# Patient Record
Sex: FEMALE | Race: WHITE | Employment: UNEMPLOYED
[De-identification: names, ages, dates, MRNs, and addresses within clinical notes are randomized per-mention and may not be internally consistent; named-entity substitution may affect disease eponyms.]

---

## 2017-06-03 ENCOUNTER — HEALTH MAINTENANCE LETTER (OUTPATIENT)
Age: 27
End: 2017-06-03

## 2017-12-16 ENCOUNTER — HEALTH MAINTENANCE LETTER (OUTPATIENT)
Age: 27
End: 2017-12-16

## 2018-06-23 ENCOUNTER — HEALTH MAINTENANCE LETTER (OUTPATIENT)
Age: 28
End: 2018-06-23

## 2018-10-15 ENCOUNTER — NURSE TRIAGE (OUTPATIENT)
Dept: NURSING | Facility: CLINIC | Age: 28
End: 2018-10-15

## 2018-10-15 ENCOUNTER — RESULT FOLLOW UP (OUTPATIENT)
Dept: OBGYN | Facility: CLINIC | Age: 28
End: 2018-10-15

## 2018-10-15 ENCOUNTER — OFFICE VISIT (OUTPATIENT)
Dept: OBGYN | Facility: CLINIC | Age: 28
End: 2018-10-15
Payer: COMMERCIAL

## 2018-10-15 VITALS
HEIGHT: 70 IN | WEIGHT: 158.7 LBS | DIASTOLIC BLOOD PRESSURE: 70 MMHG | SYSTOLIC BLOOD PRESSURE: 104 MMHG | BODY MASS INDEX: 22.72 KG/M2

## 2018-10-15 DIAGNOSIS — R87.621 PAPANICOLAOU SMEAR OF VAGINA WITH ATYPICAL SQUAMOUS CELLS CANNOT EXCLUDE HIGH GRADE SQUAMOUS INTRAEPITHELIAL LESION (ASC-H): ICD-10-CM

## 2018-10-15 DIAGNOSIS — R10.2 PELVIC CRAMPING: ICD-10-CM

## 2018-10-15 DIAGNOSIS — N97.0 ANOVULATION: ICD-10-CM

## 2018-10-15 DIAGNOSIS — Z00.00 ENCOUNTER FOR PREVENTATIVE ADULT HEALTH CARE EXAMINATION: Primary | ICD-10-CM

## 2018-10-15 LAB
SPECIMEN SOURCE: NORMAL
WET PREP SPEC: NORMAL

## 2018-10-15 PROCEDURE — 87210 SMEAR WET MOUNT SALINE/INK: CPT | Performed by: FAMILY MEDICINE

## 2018-10-15 PROCEDURE — 99395 PREV VISIT EST AGE 18-39: CPT | Performed by: FAMILY MEDICINE

## 2018-10-15 PROCEDURE — 87491 CHLMYD TRACH DNA AMP PROBE: CPT | Performed by: FAMILY MEDICINE

## 2018-10-15 PROCEDURE — 87591 N.GONORRHOEAE DNA AMP PROB: CPT | Performed by: FAMILY MEDICINE

## 2018-10-15 PROCEDURE — 88175 CYTOPATH C/V AUTO FLUID REDO: CPT | Performed by: FAMILY MEDICINE

## 2018-10-15 PROCEDURE — 87624 HPV HI-RISK TYP POOLED RSLT: CPT | Performed by: FAMILY MEDICINE

## 2018-10-15 ASSESSMENT — ENCOUNTER SYMPTOMS: EYE PAIN: 1

## 2018-10-15 ASSESSMENT — PATIENT HEALTH QUESTIONNAIRE - PHQ9
10. IF YOU CHECKED OFF ANY PROBLEMS, HOW DIFFICULT HAVE THESE PROBLEMS MADE IT FOR YOU TO DO YOUR WORK, TAKE CARE OF THINGS AT HOME, OR GET ALONG WITH OTHER PEOPLE: NOT DIFFICULT AT ALL
SUM OF ALL RESPONSES TO PHQ QUESTIONS 1-9: 0
SUM OF ALL RESPONSES TO PHQ QUESTIONS 1-9: 0

## 2018-10-15 NOTE — PATIENT INSTRUCTIONS
Labs today     Ultrasound will call you     If you want mammogram otherwise age 33     I will notify you of labs     RN notify you of pap in 2 weeks     Sperm test recommended Dr. Davison can organize that [place]     Tubal dye study HSG (hysterosalpingo-gram)     Dr. Breanna Anguiano, DO    Obstetrics and Gynecology  [facility] [place] [facility] [place]

## 2018-10-15 NOTE — LETTER
October 26, 2018    Ivette Kenyno  [address]  [place] [address]    Dear Ivette,  We are happy to inform you that your PAP smear result from 10/15/18 is normal.  We are now able to do a follow up test on PAP smears. The DNA test is for HPV (Human Papilloma Virus). Cervical cancer is closely linked with certain types of HPV. Your results showed no evidence of high risk HPV.  Therefore we recommend you return in 1 year for your next pap smear and HPV test.  You will also need to return to the clinic every year for an annual exam and other preventive tests.  If you have additional questions regarding this result, please call our registered nurse, Rochelle at [phone].  Sincerely,    Breanna Anguiano DO/randell

## 2018-10-15 NOTE — TELEPHONE ENCOUNTER
Reason for Disposition    Caller requesting lab results    Additional Information    Negative: Lab calling with strep throat test results and triager can call in prescription    Negative: Lab calling with urinalysis test results and triager can call in prescription    Negative: Medication questions    Negative: ED call to PCP    Negative: Physician call to PCP    Negative: Call about patient who is currently hospitalized    Negative: Lab or radiology calling with CRITICAL test results    Negative: [1] Prescription not at pharmacy AND [2] was prescribed today by PCP    Negative: [1] Follow-up call from patient regarding patient's clinical status AND [2] information urgent    Negative: [1] Caller requests to speak ONLY to PCP AND [2] URGENT question    Negative: [1] Caller requests to speak to PCP now AND [2] won't tell us reason for call  (Exception: if 10 pm to 6 am, caller must first discuss reason for the call)    Negative: Notification of hospital admission    Negative: Notification of death    Protocols used: [facility]

## 2018-10-15 NOTE — LETTER
October 1, 2019      Ivette Kenyon  [address] [place] [address]    Dear MsNehaKostas,      This letter is to remind you that you are due for your follow up PAP smear and HPV test on or about 10/15/19.    Please call [phone] to schedule your appointment at your earliest convenience.     If you have completed the tests outside of [facility], please have the results forwarded to our office. We will update the chart for your primary Physician to review before your next annual physical.     Sincerely,      Your [facility] Care Team//[facility]

## 2018-10-15 NOTE — MR AVS SNAPSHOT
After Visit Summary   10/15/2018    Ivette Kenyon    MRN: [id number]           Patient Information     Date Of Birth          1990        Visit Information        Provider Department      10/15/2018 9:15 AM Breanna Anguiano,  [facility]        Today's Diagnoses     Encounter for preventative adult health care examination    -  1    Papanicolaou smear of vagina with atypical squamous cells cannot exclude high grade squamous intraepithelial lesion (ASC-H)        Anovulation        Pelvic cramping          Care Instructions    Labs today     Ultrasound will call you     If you want mammogram otherwise age 33     I will notify you of labs     RN notify you of pap in 2 weeks     Sperm test recommended Dr. Davison can organize that [place]     Tubal dye study HSG (hysterosalpingo-gram)     Dr. Breanna Anguiano, DO    Obstetrics and Gynecology  [facility] and [place]                 Follow-ups after your visit        Future tests that were ordered for you today     Open Future Orders        Priority Expected Expires Ordered    US Pelvic Complete with Transvaginal Routine 10/15/2018 4/13/2019 10/15/2018            Who to contact     If you have questions or need follow up information about today's clinic visit or your schedule please contact [facility] directly at [phone].  Normal or non-critical lab and imaging results will be communicated to you by MyChart, letter or phone within 4 business days after the clinic has received the results. If you do not hear from us within 7 days, please contact the clinic through MyChart or phone. If you have a critical or abnormal lab result, we will notify you by phone as soon as possible.  Submit refill requests through [employer] or call your pharmacy and they will forward the refill request to us. Please allow 3 business days for your refill to be completed.          Additional Information About Your  "Visit        [employer] Information     CSR lets you send messages to your doctor, view your test results, renew your prescriptions, schedule appointments and more. To sign up, go to [email or web address] . Click on \"Log in\" on the left side of the screen, which will take you to the Welcome page. Then click on \"Sign up Now\" on the right side of the page.     You will be asked to enter the access code listed below, as well as some personal information. Please follow the directions to create your username and password.     Your access code is: [email or web address]  Expires: 2019  9:37 AM     Your access code will  in 90 days. If you need help or a new code, please call your [place] clinic or [phone].        Care EveryWhere ID     This is your Care EveryWhere ID. This could be used by other organizations to access your [place] medical records  [id number]        Your Vitals Were     Height Last Period BMI (Body Mass Index)             5' 10\" (1.778 m) 2018 22.77 kg/m2          Blood Pressure from Last 3 Encounters:   10/15/18 104/70   14 98/70   13 122/70    Weight from Last 3 Encounters:   10/15/18 158 lb 11.2 oz (72 kg)   14 145 lb (65.8 kg)   13 140 lb 1.6 oz (63.5 kg)              We Performed the Following     Androstenedione     Anti-Mullerian hormone     CBC with platelets     Chlamydia trachomatis PCR     Comprehensive metabolic panel     DHEA sulfate     Estradiol     Follicle stimulating hormone     Lipid panel reflex to direct LDL Fasting     Lutropin     Neisseria gonorrhoeae PCR     Progesterone     Prolactin     Testosterone Free and Total     TSH with free T4 reflex        Primary Care Provider Office Phone # Fax #    Martha Jean [phone]       [address] [address]        Equal Access to Services     DIANE POLANCO AH: Felix Shannon, waoksanada derrickqadaha, qaybta kaalmadeneen gomez, vicki acevedo " deshawn soni ah. So [facility] [phone].    ATENCIÓN: Si andrewla fawad, tiene a sanchez disposición servicios gratuitos de asistencia lingüística. Vale weller [phone].    We comply with applicable federal civil rights laws and [place] laws. We do not discriminate on the basis of race, color, national origin, age, disability, sex, sexual orientation, or gender identity.            Thank you!     Thank you for choosing [facility]  for your care. Our goal is always to provide you with excellent care. Hearing back from our patients is one way we can continue to improve our services. Please take a few minutes to complete the written survey that you may receive in the mail after your visit with us. Thank you!             Your Updated Medication List - Protect others around you: Learn how to safely use, store and throw away your medicines at www.disposemymeds.org.          This list is accurate as of 10/15/18  9:44 AM.  Always use your most recent med list.                   Brand Name Dispense Instructions for use Diagnosis    CALCIUM 500 + D PO      Take  by mouth.        fish oil-omega-3 fatty acids 1000 MG capsule     180 capsule    Take 2 capsules by mouth daily.        folic acid 400 MCG tablet    FOLVITE    90 tablet    Take 1 tablet by mouth daily.        LORazepam 0.5 MG tablet    ATIVAN    30 tablet    Take 1 tablet (0.5 mg) by mouth every 6 hours as needed for anxiety    Anxiety       Multi vitamin  /Minerals Tabs      Take  by mouth.        PARoxetine 20 MG tablet    PAXIL    90 tablet    Take 1 tablet (20 mg) by mouth At Bedtime    Anxiety attack       traMADol 50 MG tablet    ULTRAM    30 tablet    Take 1 tablet (50 mg) by mouth every 6 hours as needed for moderate pain    Headache(784.0)

## 2018-10-15 NOTE — TELEPHONE ENCOUNTER
Patient returning call re: lab results.  FNA advised wet prep is normal; other labs still in process.  Caller verbalizes understanding.

## 2018-10-15 NOTE — PROGRESS NOTES
SUBJECTIVE:  Ivette Kenyon is an 28 year old  woman who presents for   annual gyn exam. Patient's last menstrual period was 2018. Periods are regular q 28-30 days, lasting 5 days. Dysmenorrhea:mild, occurring throughout menses. Cyclic symptoms include none. No intermenstrual bleeding,   spotting, or discharge. STD hx: HPV.    Current contraception: none  JOSH exposure: no  History of abnormal Pap smear: Yes   2005 NIL   2006 NIL   1/3/12 per OV notes abnormal pap done elsewhere   1/3/12 ASC-H dx pap to MD for review. Pt to schedule colp.    12 colposcopy  LEESA I, II & III.  Await MD directive.    LEEP scheduled for 12. At  apt, pt opted for pap q 6 months for 2  years.   12 colp LEESA II and III.  MD suggest apt to discuss.    13 LSIL  MD to advise for plan Tra CARDENAS  Family history of uterine or ovarian cancer: No  Regular self breast exam: Yes  History of abnormal mammogram: No  Family history of breast cancer: Yes - Mother  History of abnormal lipids: No      - Pt has not been seen since , due to lack of insurance. She states her last few menses have involved more mild to moderate pelvic cramping. Pt would also like to discuss conception and if there are any concerns with abnormal pap history. She has been having unprotected intercourse for around 1 year, without pregnancy -- is concerned about this.       Past Medical History:   Diagnosis Date     Abnormal finding on Pap smear     First abnormal pap was      Acid reflux      Depressive disorder     currently stable on no meds     HGSIL (high grade squamous intraepithelial dysplasia) 10    2 colp and LEEP        Family History   Problem Relation Age of Onset     Breast Cancer Mother      Arthritis Father      Cancer Maternal Grandmother      Cancer Paternal Grandmother      Cancer Paternal Grandfather        Past Surgical History:   Procedure Laterality Date     3 wisdom teeth removed       COLP,CX W/UP  "ADJ VAG,W/BX, CX      LEESA I II III present HGSIL     LEEP TX, CERVICAL       TONSILLECTOMY, ADENOIDECTOMY, COMBINED      T & A 10+y.o.       Current Outpatient Prescriptions   Medication     Calcium Carbonate-Vitamin D (CALCIUM 500 + D PO)     fish oil-omega-3 fatty acids (FISH OIL) 1000 MG capsule     folic acid (FOLVITE) 400 MCG tablet     LORazepam (ATIVAN) 0.5 MG tablet     Multiple Vitamins-Minerals (MULTI VITAMIN/MINERALS) TABS     PARoxetine (PAXIL) 20 MG tablet     traMADol (ULTRAM) 50 MG tablet     No current facility-administered medications for this visit.      Allergies   Allergen Reactions     No Known Allergies        Social History   Substance Use Topics     Smoking status: Former Smoker     Packs/day: 0.50     Years: 4.00     Types: Cigarettes     Smokeless tobacco: Never Used      Comment: 1 cigarette per week     Alcohol use No       Review Of Systems  Ears/Nose/Throat: negative  Respiratory: No shortness of breath, dyspnea on exertion, cough, or hemoptysis  Cardiovascular: negative  Gastrointestinal: negative  Genitourinary: negative  Constitutional, HEENT, cardiovascular, pulmonary, GI, , musculoskeletal, neuro, skin, endocrine and psych systems are negative, except as otherwise noted.      This document serves as a record of the services and decisions personally performed and made by Breanna Anguiano DO. It was created on her behalf by Columba Hillman, a trained medical scribe. The creation of this document is based the provider's statements to the medical scribe.  Scribsha Hillman 9:27 AM, October 15, 2018    OBJECTIVE:  /70  Ht 1.778 m (5' 10\")  Wt 72 kg (158 lb 11.2 oz)  LMP 09/18/2018  BMI 22.77 kg/m2  General appearance: healthy, alert and no distress  Skin: Skin color, texture, turgor normal. No rashes or lesions.  Ears: negative  Nose/Sinuses: Nares normal. Septum midline. Mucosa normal. No drainage or sinus tenderness.  Oropharynx: Lips, mucosa, and tongue normal. Teeth and gums " normal.  Neck: Neck supple. No adenopathy. Thyroid symmetric, normal size,, Carotids without bruits.  Lungs: negative, Percussion normal. Good diaphragmatic excursion. Lungs clear  Heart: negative, PMI normal. No lifts, heaves, or thrills. RRR. No murmurs, clicks gallops or rub  Breasts: Inspection negative. No nipple discharge or bleeding. No masses.  Abdomen: Abdomen soft, non-tender. BS normal. No masses, organomegaly  Pelvic: Pelvic examination with pap/with Gonorrhea and Chlamydia   including  External genitalia normal   and vagina normal rugatted not atrophic  Examination of urethra  normal no masses, tenderness, scarring  bladder, no masses or tenderness  Cervix no lesions or discharge  Bimanual exam with   Uterus 6 weeks size, mid position, mobile, no tenderness, no descent   Adnexa/parametria normal          ASSESSMENT:  Ivette Kenyon is an 28 year old  woman who presents for   annual gyn exam.     PLAN:  Dx: Annual gyn physical; Anovulation; Pelvic cramping  1)  Labs pending; Pap smear - pathology pending; Mammogram not indicated at this age  2)  Anovulation: Discussed steps for investigating   - US, 3 day & AMH lab   - Sperm count for partner   - XR Hysterosalpingogram   - Clomid/Femara, infertility clinic referral to discuss IUI or IVF  3)  Pt would like to proceed with US & Labs, advised to begin using an ovulation andra [Ovia] to track when she is fertile and Precede lube if needed [others can kill sperm]   - Will consider further options if needed   - Advised to begin taking prenatal vitamins OR two [place] tabs daily  4)  Pelvic cramping: Wet prep & GC pending; US ordered  5)  Return to clinic in 3-4 months for follow-up, otherwise as needed.       Rx:  None      PE:  Reviewed health maintenance including diet, regular exercise   and periodic exams.        The information in this document, created by the medical scribe for me, accurately reflects the services I personally performed and the  decisions made by me. I have reviewed and approved this document for accuracy prior to leaving the patient care area.  9:27 AM, 10/15/18    Dr. Breanna Anguiano,     Obstetrics and Gynecology  [facility] and [place]

## 2018-10-15 NOTE — LETTER
[facility]  [address], [address]  [place] [address]  [phone]        October 19, 2018    Ivette Kenyon  [address] [address]            Dear Ms. Ivette Kenyon:      The results of your recent lab were NORMAL and are enclosed.    If you have any further questions or concerns, please contact our office.          Sincerely,      Breanna Anguiano DO

## 2018-10-15 NOTE — NURSING NOTE
"Chief Complaint   Patient presents with     Gyn Exam     cramps have been bad during periods--discuss fertility       Initial /70  Ht 5' 10\" (1.778 m)  Wt 158 lb 11.2 oz (72 kg)  LMP 2018  BMI 22.77 kg/m2 Estimated body mass index is 22.77 kg/(m^2) as calculated from the following:    Height as of this encounter: 5' 10\" (1.778 m).    Weight as of this encounter: 158 lb 11.2 oz (72 kg).  BP completed using cuff size: regular        The following HM Due: pap smear      "

## 2018-10-16 ASSESSMENT — PATIENT HEALTH QUESTIONNAIRE - PHQ9: SUM OF ALL RESPONSES TO PHQ QUESTIONS 1-9: 0

## 2018-10-17 LAB
COPATH REPORT: NORMAL
PAP: NORMAL

## 2018-10-19 LAB
C TRACH DNA SPEC QL NAA+PROBE: NEGATIVE
FINAL DIAGNOSIS: NORMAL
HPV HR 12 DNA CVX QL NAA+PROBE: NEGATIVE
HPV16 DNA SPEC QL NAA+PROBE: NEGATIVE
HPV18 DNA SPEC QL NAA+PROBE: NEGATIVE
N GONORRHOEA DNA SPEC QL NAA+PROBE: NEGATIVE
SPECIMEN DESCRIPTION: NORMAL
SPECIMEN SOURCE CVX/VAG CYTO: NORMAL
SPECIMEN SOURCE: NORMAL
SPECIMEN SOURCE: NORMAL

## 2018-10-25 NOTE — PROGRESS NOTES
1/3/12 per OV notes abnormal pap done elsewhere  1/3/12 ASC-H dx pap to MD for review. Plan [place].    1/25/12 colposcopy  LEESA I, II, & III.  Plan LEEP   LEEP scheduled for 2/23/12. At 2/23 apt, pt opted for pap q 6 months for 2 years.  9/23/12 colp LEESA II and III.  MD suggest apt to discuss.   07/18/13: LSIL pap, cannot exclude a High grade lesion.   10/30/13: [place] Bx's and ECC LEESA 2-3. Plan LEEP   12/19/13:LEEP Bx's LEESA 3 involving 2 quadrants. Margins neg for dysplasia. Plan repeat pap in 6 months.   10/15/18: NIL pap, Neg HR HPV result. Plan cotest in 1 year per provider.   10/26/18 Result letter sent at request of RN. ([facility])  10/01/19 Cotest reminder letter sent. ([facility])  10/29/19 Spoke with pt, states she will call to schedule. ([facility])  11/26/19 Patient is lost to pap tracking follow-up. FYI routed to provider. ([facility])

## 2018-12-04 ENCOUNTER — RADIANT APPOINTMENT (OUTPATIENT)
Dept: ULTRASOUND IMAGING | Facility: CLINIC | Age: 28
End: 2018-12-04
Attending: FAMILY MEDICINE
Payer: COMMERCIAL

## 2018-12-04 DIAGNOSIS — R10.2 PELVIC CRAMPING: ICD-10-CM

## 2018-12-04 DIAGNOSIS — N97.0 ANOVULATION: ICD-10-CM

## 2018-12-04 PROCEDURE — 76830 TRANSVAGINAL US NON-OB: CPT | Performed by: FAMILY MEDICINE

## 2018-12-04 PROCEDURE — 76856 US EXAM PELVIC COMPLETE: CPT | Performed by: FAMILY MEDICINE

## 2019-01-09 ENCOUNTER — HOSPITAL ENCOUNTER (OUTPATIENT)
Dept: ULTRASOUND IMAGING | Facility: CLINIC | Age: 29
Discharge: HOME | End: 2019-01-09
Attending: INTERNAL MEDICINE | Admitting: INTERNAL MEDICINE
Payer: COMMERCIAL

## 2019-01-09 DIAGNOSIS — O26.859 SPOTTING IN EARLY PREGNANCY: ICD-10-CM

## 2019-01-09 LAB
ABO + RH BLD: NORMAL
ABO + RH BLD: NORMAL
BLD GP AB SCN SERPL QL: NORMAL
BLOOD BANK CMNT PATIENT-IMP: NORMAL
SPECIMEN EXP DATE BLD: NORMAL

## 2019-01-09 PROCEDURE — 86901 BLOOD TYPING SEROLOGIC RH(D): CPT | Performed by: FAMILY MEDICINE

## 2019-01-09 PROCEDURE — 86850 RBC ANTIBODY SCREEN: CPT | Performed by: FAMILY MEDICINE

## 2019-01-09 PROCEDURE — 76801 OB US < 14 WKS SINGLE FETUS: CPT

## 2019-01-09 PROCEDURE — 86900 BLOOD TYPING SEROLOGIC ABO: CPT | Performed by: FAMILY MEDICINE

## 2019-01-09 PROCEDURE — 36415 COLL VENOUS BLD VENIPUNCTURE: CPT | Performed by: FAMILY MEDICINE

## 2019-01-09 PROCEDURE — 84702 CHORIONIC GONADOTROPIN TEST: CPT | Performed by: FAMILY MEDICINE

## 2019-01-10 LAB — B-HCG SERPL-ACNC: ABNORMAL IU/L (ref 0–5)

## 2019-01-14 DIAGNOSIS — Z34.90 SUPERVISION OF NORMAL PREGNANCY: Primary | ICD-10-CM

## 2019-01-21 ENCOUNTER — OFFICE VISIT (OUTPATIENT)
Dept: OBGYN | Facility: CLINIC | Age: 29
End: 2019-01-21
Payer: COMMERCIAL

## 2019-01-21 VITALS
SYSTOLIC BLOOD PRESSURE: 112 MMHG | HEIGHT: 70 IN | DIASTOLIC BLOOD PRESSURE: 64 MMHG | WEIGHT: 153 LBS | BODY MASS INDEX: 21.9 KG/M2

## 2019-01-21 PROCEDURE — 99207 ZZC PRENATAL VISIT: CPT | Performed by: FAMILY MEDICINE

## 2019-01-21 ASSESSMENT — MIFFLIN-ST. JEOR: SCORE: 1494.25

## 2019-01-21 NOTE — NURSING NOTE
"Chief Complaint   Patient presents with     Follow Up     Follow up after Er visit for bleeding in early pregnancy. No bleeding since US on 19.       Initial /64   Ht 1.778 m (5' 10\")   Wt 69.4 kg (153 lb)   LMP 2018 (Approximate)   Breastfeeding? No   BMI 21.95 kg/m   Estimated body mass index is 21.95 kg/m  as calculated from the following:    Height as of this encounter: 1.778 m (5' 10\").    Weight as of this encounter: 69.4 kg (153 lb).  BP completed using cuff size: regular    Questioned patient about current smoking habits.  Pt. quit smoking some time ago.          Marquita Stark LPN               "

## 2019-01-21 NOTE — PATIENT INSTRUCTIONS
Return in 4 weeks   Return to clinic:  every 4 weeks till 30 weeks, then every 2 weeks till 36 weeks, then weekly till delivery    For innatal fetal dna test:  Schedule nurse appt [place] location   [phone]      Phone numbers [place]:  Day/ night [phone] ask for ob triage  Emergency:  Call labor and delivery:  [phone]    What should I call about??    Contraction every 5 minutes for 1 hour 1 minute long (511), bleeding, loss of fluid, headache that doesn't resolve with tylenol, and decreased fetal movement     Start kick counts @ 26-28 weeks   Keep track of movement and discover your normal baby movement pattern   guideline is listed below  Please call if you do not feel the baby move!  We will have you come in for fetal heart rate monitoring:   Perception of at least 10 FMs during 12 hours of normal maternal activity   Perception of least 10 FMs over two hours when the mother is at rest and focused on [facility] Address   [address], [place] [address]  [phone]    Dr. Breanna Anguiano, DO    OB/GYN   [facility]

## 2019-01-21 NOTE — PROGRESS NOTES
"SUBJECTIVE:  Ivette Kenyon is an 28 year old  woman who presents for   Gyn follow-up exam. She was seen in [place] ED on 2019, for bleeding in early pregnancy.     Pt was triaged on the morning of 2019: \"Patient calling: crying  Very upset over ER experience in [place] last night.  LMP 18 GA 7w3d  G1  Last night has some brown chunky discharge.  Some cramping.Last time her treatment was beta HCG & US.     Beta HCG 2019: 42,417      US Results:  \"IMPRESSION: Live intrauterine pregnancy measures 7 weeks 0 days with  an estimated date of delivery 2019. This is consistent with prior  LMP dating with a stated LMP date of 2018. Subchorionic  hemorrhage. Follow-up as clinically warranted.\"      Today in clinic:   - Pt states she has not experienced any further bleeding since the night in the ED. She has been on pelvic rest for the past 2 weeks -- no lifting more than 20 lbs & no sexual intercourse.         Past Medical History:   Diagnosis Date     Abnormal finding on Pap smear     First abnormal pap was      Acid reflux      Depressive disorder     currently stable on no meds     HGSIL (high grade squamous intraepithelial dysplasia) 10    2 colp and LEEP          Family History   Problem Relation Age of Onset     Breast Cancer Mother      Arthritis Father      Cancer Maternal Grandmother      Cancer Paternal Grandmother      Cancer Paternal Grandfather        Past Surgical History:   Procedure Laterality Date     3 wisdom teeth removed       COLP,CX W/UP ADJ VAG,W/BX, CX      LEESA I II III present HGSIL     LEEP TX, CERVICAL       TONSILLECTOMY, ADENOIDECTOMY, COMBINED      T & A 10+y.o.       Current Outpatient Medications   Medication     Prenatal Vit-Fe Fumarate-FA (PRENATAL VITAMIN PO)     Probiotic Product (PROBIOTIC ADVANCED PO)     Calcium Carbonate-Vitamin D (CALCIUM 500 + D PO)     fish oil-omega-3 fatty acids (FISH OIL) 1000 MG capsule     folic acid " "(FOLVITE) 400 MCG tablet     LORazepam (ATIVAN) 0.5 MG tablet     Multiple Vitamins-Minerals (MULTI VITAMIN/MINERALS) TABS     PARoxetine (PAXIL) 20 MG tablet     traMADol (ULTRAM) 50 MG tablet     No current facility-administered medications for this visit.      Allergies   Allergen Reactions     No Known Allergies        Social History     Tobacco Use     Smoking status: Former Smoker     Packs/day: 0.50     Years: 4.00     Pack years: 2.00     Types: Cigarettes     Smokeless tobacco: Never Used     Tobacco comment: 1 cigarette per week   Substance Use Topics     Alcohol use: No       Review Of Systems  Ears/Nose/Throat: negative  Respiratory: No shortness of breath, dyspnea on exertion, cough, or hemoptysis  Cardiovascular: negative  Gastrointestinal: negative  Genitourinary: negative  Constitutional, HEENT, cardiovascular, pulmonary, GI, , musculoskeletal, neuro, skin, endocrine and psych systems are negative, except as otherwise noted.        This document serves as a record of the services and decisions personally performed and made by Breanna Anguiano DO. It was created on her behalf by Columba Hillman, a trained medical scribe. The creation of this document is based the provider's statements to the medical scribe.  Scribe Columba Hillman 3:10 PM, 2019    OBJECTIVE:  /64   Ht 1.778 m (5' 10\")   Wt 69.4 kg (153 lb)   LMP 2018 (Approximate)   Breastfeeding? No   BMI 21.95 kg/m    General appearance: healthy, alert and no distress  Skin: Skin color, texture, turgor normal. No rashes or lesions.  Lungs: negative, Percussion normal. Good diaphragmatic excursion. Lungs clear  Heart: negative, PMI normal. No lifts, heaves, or thrills. RRR. No murmurs, clicks gallops or rub  Abdomen: Abdomen soft, non-tender. BS normal. No masses, organomegaly  Pelvic: External genitalia and vagina normal.          ASSESSMENT:  Ivette Kenyon is an 28 year old  woman who presents for   Gyn follow-up " exam. She was seen in the [place] ED on 01/08/2019, for bleeding in early pregnancy.     PLAN:  Dx: Subchorionic hemorrhage in first trimester  1)  Subchorionic hemorrhage: Found on US 01/09/2019; Informed they will monitor this with repeat US every 3 weeks until resolved   - May stop pelvic rest at this time, if bleeding recurs then resume  2)  Bedside US today in clinic -- FHT @ 168  3)  Return to clinic for 1st OB visit; otherwise as needed.       Rx:  None      The information in this document, created by the medical scribe for me, accurately reflects the services I personally performed and the decisions made by me. I have reviewed and approved this document for accuracy prior to leaving the patient care area.  3:09 PM, 01/21/19    Dr. Breanna Anguiano, DO    OB/GYN   [facility]

## 2019-01-24 ENCOUNTER — PRENATAL OFFICE VISIT (OUTPATIENT)
Dept: NURSING | Facility: CLINIC | Age: 29
End: 2019-01-24
Payer: COMMERCIAL

## 2019-01-24 DIAGNOSIS — Z34.90 SUPERVISION OF NORMAL PREGNANCY: Primary | ICD-10-CM

## 2019-01-24 PROCEDURE — 99207 ZZC NO CHARGE NURSE ONLY: CPT

## 2019-01-24 NOTE — NURSING NOTE
NPN nurse visit. 1st dr visit scheduled for 2/19/19 with Gricelda Ac M.D.  Pap not due. Last pap 10/2018.  9w4d    CHRISTELLE Lyle RN

## 2019-02-19 ENCOUNTER — PRENATAL OFFICE VISIT (OUTPATIENT)
Dept: OBGYN | Facility: CLINIC | Age: 29
End: 2019-02-19
Payer: MEDICAID

## 2019-02-19 VITALS
HEIGHT: 70 IN | SYSTOLIC BLOOD PRESSURE: 118 MMHG | DIASTOLIC BLOOD PRESSURE: 62 MMHG | WEIGHT: 158 LBS | BODY MASS INDEX: 22.62 KG/M2

## 2019-02-19 DIAGNOSIS — G44.219 EPISODIC TENSION-TYPE HEADACHE, NOT INTRACTABLE: Primary | ICD-10-CM

## 2019-02-19 DIAGNOSIS — Z34.91 PRENATAL CARE IN FIRST TRIMESTER: ICD-10-CM

## 2019-02-19 PROCEDURE — 40000791 ZZHCL STATISTIC VERIFI PRENATAL TRISOMY 21,18,13: Mod: 90 | Performed by: FAMILY MEDICINE

## 2019-02-19 PROCEDURE — 99000 SPECIMEN HANDLING OFFICE-LAB: CPT | Performed by: FAMILY MEDICINE

## 2019-02-19 PROCEDURE — 36415 COLL VENOUS BLD VENIPUNCTURE: CPT | Performed by: FAMILY MEDICINE

## 2019-02-19 PROCEDURE — 87491 CHLMYD TRACH DNA AMP PROBE: CPT | Performed by: FAMILY MEDICINE

## 2019-02-19 PROCEDURE — 87591 N.GONORRHOEAE DNA AMP PROB: CPT | Performed by: FAMILY MEDICINE

## 2019-02-19 PROCEDURE — 99207 ZZC FIRST OB VISIT: CPT | Performed by: FAMILY MEDICINE

## 2019-02-19 RX ORDER — FERROUS GLUCONATE 324(38)MG
324 TABLET ORAL
COMMUNITY

## 2019-02-19 RX ORDER — METOCLOPRAMIDE 10 MG/1
10 TABLET ORAL
Qty: 40 TABLET | Refills: 0 | Status: SHIPPED | OUTPATIENT
Start: 2019-02-19

## 2019-02-19 ASSESSMENT — MIFFLIN-ST. JEOR: SCORE: 1516.93

## 2019-02-19 NOTE — NURSING NOTE
"Chief Complaint   Patient presents with     Prenatal Care     13 2/7 weeks       Initial /62 (BP Location: Left arm, Patient Position: Chair, Cuff Size: Adult Regular)   Ht 1.778 m (5' 10\")   Wt 71.7 kg (158 lb)   LMP 2018 (Exact Date)   BMI 22.67 kg/m   Estimated body mass index is 22.67 kg/m  as calculated from the following:    Height as of this encounter: 1.778 m (5' 10\").    Weight as of this encounter: 71.7 kg (158 lb).  BP completed using cuff size: regular    Questioned patient about current smoking habits.  Pt. recently quit smoking.    +++severe headaches        The following HM Due:GC/BABITA Keys CMA           "

## 2019-02-19 NOTE — PROGRESS NOTES
Ivette Kenyon is a 28 year old  @ 13w2d wks EGA with Aug 25, 2019 who presents to the clinic for an new ob visit.         Estimated Date of Delivery: Aug 25, 2019  Reviewed nurse intake visit on 2019    Concerns: Pt reports severe headaches, the current episode lasting for over 20 hours & keeping her from getting any sleep.     Patient Active Problem List   Diagnosis     Excessive or frequent menstruation     Papanicolaou smear of vagina with atypical squamous cells cannot exclude high grade squamous intraepithelial lesion (ASC-H)     Insomnia     Anxiety     CARDIOVASCULAR SCREENING; LDL GOAL LESS THAN 160     Preventative health care     Papanicolaou smear of vagina with high grade squamous intraepithelial lesion (HGSIL)     Smoker     pt interested in HPV vaccine     Past Medical History:   Diagnosis Date     Abnormal finding on Pap smear     First abnormal pap was      Acid reflux      Depressive disorder     currently stable on no meds     HGSIL (high grade squamous intraepithelial dysplasia) 10    2 colp and LEEP     Past Surgical History:   Procedure Laterality Date     3 wisdom teeth removed       COLP,CX W/UP ADJ VAG,W/BX, CX      LEESA I II III present HGSIL     LEEP TX, CERVICAL       TONSILLECTOMY, ADENOIDECTOMY, COMBINED      T & A 10+y.o.     Current Outpatient Medications   Medication Sig Dispense Refill     ferrous gluconate (FERGON) 324 (38 Fe) MG tablet Take 324 mg by mouth daily (with breakfast)       metoclopramide (REGLAN) 10 MG tablet Take 1 tablet (10 mg) by mouth 4 times daily (before meals and nightly) 40 tablet 0     Prenatal Vit-Fe Fumarate-FA (PRENATAL VITAMIN PO) Take by mouth daily         ====================================================  PERSONAL/SOCIAL HISTORY  Social History     Socioeconomic History     Marital status: Single     Spouse name: Not on file     Number of children: Not on file     Years of education: Not on file     Highest education level:  Not on file   Social Needs     Financial resource strain: Not on file     Food insecurity - worry: Not on file     Food insecurity - inability: Not on file     Transportation needs - medical: Not on file     Transportation needs - non-medical: Not on file   Occupational History     Employer: [employer]     Occupation:    Tobacco Use     Smoking status: Former Smoker     Packs/day: 0.50     Years: 4.00     Pack years: 2.00     Types: Cigarettes     Smokeless tobacco: Never Used   Substance and Sexual Activity     Alcohol use: No     Drug use: Yes     Types: Marijuana     Comment: prior to pregnancy     Sexual activity: Yes     Partners: Male   Other Topics Concern      Service Not Asked     Blood Transfusions No     Caffeine Concern Not Asked     Occupational Exposure Not Asked     Hobby Hazards Not Asked     Sleep Concern Yes     Comment: works all shift.  difficulty sleeping.     Stress Concern Not Asked     Weight Concern Not Asked     Special Diet Not Asked     Back Care Not Asked     Exercise Not Asked     Bike Helmet No     Seat Belt Yes     Self-Exams Not Asked     Parent/sibling w/ CABG, MI or angioplasty before 65F 55M? Not Asked   Social History Narrative    Lives with fiance     =====================================================   REVIEW OF SYSTEMS  Constitutional, HEENT, cardiovascular, pulmonary, GI, , musculoskeletal, neuro, skin, endocrine and psych systems are negative, except as otherwise noted.        This document serves as a record of the services and decisions personally performed and made by Breanna Anguiano DO. It was created on her behalf by Columba Hillman, a trained medical scribe. The creation of this document is based the provider's statements to the medical scribe.  Scribe Columba Hillman 9:11 AM, February 19, 2019    ====================================================  PHYSICAL EXAM:  /62 (BP Location: Left arm, Patient Position: Chair, Cuff Size: Adult Regular)    "Ht 1.778 m (5' 10\")   Wt 71.7 kg (158 lb)   LMP 2018 (Exact Date)   BMI 22.67 kg/m     GENERAL:  Pleasant pregnant female, alert, well groomed.  SKIN:  Warm and dry, without lesions or rashes  HEAD: Symmetrical features.  EYES:  PERRLA,   MOUTH:  Buccal mucosa pink, moist without lesions.    NECK:  Thyroid without enlargement and nodules.  Lymph nodes not palpable.   LUNGS:  Clear to auscultation.  BREAST:  Symmetrical.  No dominant, fixed or suspicious masses are noted.  No skin or nipple changes or axillary nodes.  Self exam is taught and encouraged.  Nipples everted.      HEART:  RRR without murmur.  ABDOMEN: Soft without masses , tenderness or organomegaly.  No CVA tenderness. No scars noted. FHT heard  MUSCULOSKELETAL:  Full range of motion  EXTREMITIES:  No edema. No significant varicosities.   GENITALIA:  BUS WNL, no lesions noted   VAGINA:  Pink, normal rugae and discharge normal and physiologic,   CERVIX:  smooth, without discharge or CMT and nulliparous os,   firm/ closed 4 cm long.  UTERUS: Anteverted, nontender 12 weeks in size.  ADNEXA: Without masses or tenderness  PELVIS:   Adequate, Pelvis proven to -pelvis not tested.    =========================================  [facility]SI Routine Prenatal Carfe Guideline  ASSESSMENT/PLAN:  Ivette Kenyon is a 28 year old  @ 13w2d  wks EGA with EDC Estimated Date of Delivery: Aug 25, 2019 who presents to the clinic for an new ob visit.        1) Intrauterine pregnancy:  Concerns: Headaches -- Reglan rx given, may also continue using Tylenol  2) EDUCATION :    RECOMMENDED WEIGHT GAIN: 25-35 lbs.  Instructed on best evidence for: weight gain for her BMI for pregnancy; healthy diet and foods to avoid; exercise and activity during pregnancy;avoiding exposure to toxoplasmosis; and maintenance of a generally healthy lifestyle.   Discussed the harms, benefits, side effects and alternative therapies for current prescribed and OTC medications.  3) Counseled " about genetic screening tests (Innatal, preparent with options, discussed standard panel and other options, 1st trimester screen and targeted anatomy scan and AFP and quad screen if first trimester screening not done) and invasive definitive testing (chorionic villus sampling and genetic amniocentesis).  Patient wishes to undergo innatal & AFP screening.   4) Routine care: Desires innatal & AFP screening; GC - pending  5) Hx of LEEP procedure: Cervical length check @ 20w  6) Return: 4 weeks      Rx:  - Reglan 10 mg 1 tab po QID [before meals & nightly]        The information in this document, created by the medical scribe for me, accurately reflects the services I personally performed and the decisions made by me. I have reviewed and approved this document for accuracy prior to leaving the patient care area.  9:11 AM, 02/19/19    Dr. Breanna Anguiano, DO    OB/GYN   [facility]

## 2019-02-19 NOTE — PATIENT INSTRUCTIONS
Return in 4 weeks   Return to clinic:  every 4 weeks till 30 weeks, then every 2 weeks till 36 weeks, then weekly till delivery      Phone numbers [place]:  Day/ night [phone] ask for ob triage  Emergency:  Call labor and delivery:  [phone]    What should I call about??    Contraction every 5 minutes for 1 hour 1 minute long (511), bleeding, loss of fluid, headache that doesn't resolve with tylenol, and decreased fetal movement     Start kick counts @ 26-28 weeks   Keep track of movement and discover your normal baby movement pattern   guideline is listed below  Please call if you do not feel the baby move!  We will have you come in for fetal heart rate monitoring:   Perception of at least 10 FMs during 12 hours of normal maternal activity   Perception of least 10 FMs over two hours when the mother is at rest and focused on [facility] Address   [address], [place], [place] [address] [phone]    Dr. Breanna Anguiano, DO    OB/GYN   [facility]

## 2019-02-20 LAB
C TRACH DNA SPEC QL NAA+PROBE: NEGATIVE
N GONORRHOEA DNA SPEC QL NAA+PROBE: NEGATIVE
SPECIMEN SOURCE: NORMAL
SPECIMEN SOURCE: NORMAL

## 2019-02-25 ENCOUNTER — TELEPHONE (OUTPATIENT)
Dept: OBGYN | Facility: CLINIC | Age: 29
End: 2019-02-25

## 2019-02-25 NOTE — TELEPHONE ENCOUNTER
Pt advised of normal results.  Pt requesting to have the gender noted on paper in a sealed envelope.  She will pick it up at the [facility] office.  Routed to Md as an  FYI.     Giovana JOYCE RN

## 2019-02-25 NOTE — TELEPHONE ENCOUNTER
Results received from [facility] testing in [facility].  Testing done:  Innatal Prenatal Screen    Action:  LM for pt to cb to report NORMAL results.    Gender: **GIRL**  Will ask pt if they wish to know the gender.    Please route to provider as FYI once pt is informed. (KT)    Giovana JOYCE, RN

## 2021-01-17 ENCOUNTER — MEDICAL CORRESPONDENCE (OUTPATIENT)
Dept: HEALTH INFORMATION MANAGEMENT | Facility: CLINIC | Age: 31
End: 2021-01-17

## 2021-01-17 ENCOUNTER — TRANSFERRED RECORDS (OUTPATIENT)
Dept: HEALTH INFORMATION MANAGEMENT | Facility: CLINIC | Age: 31
End: 2021-01-17

## 2021-01-18 ENCOUNTER — TRANSCRIBE ORDERS (OUTPATIENT)
Dept: MATERNAL FETAL MEDICINE | Facility: CLINIC | Age: 31
End: 2021-01-18

## 2021-01-18 DIAGNOSIS — O26.90 PREGNANCY RELATED CONDITION, ANTEPARTUM: Primary | ICD-10-CM

## 2021-02-02 ENCOUNTER — PRE VISIT (OUTPATIENT)
Dept: MATERNAL FETAL MEDICINE | Facility: CLINIC | Age: 31
End: 2021-02-02

## 2021-02-09 ENCOUNTER — VIRTUAL VISIT (OUTPATIENT)
Dept: MATERNAL FETAL MEDICINE | Facility: CLINIC | Age: 31
End: 2021-02-09
Attending: MIDWIFE
Payer: COMMERCIAL

## 2021-02-09 DIAGNOSIS — Z36.9 PRENATAL SCREENING ENCOUNTER: Primary | ICD-10-CM

## 2021-02-09 DIAGNOSIS — O26.90 PREGNANCY RELATED CONDITION, ANTEPARTUM: ICD-10-CM

## 2021-02-09 PROCEDURE — 96040 HC GENETIC COUNSELING, EACH 30 MINUTES: CPT | Mod: 95 | Performed by: GENETIC COUNSELOR, MS

## 2021-02-09 NOTE — PROGRESS NOTES
[facility]  Genetic Counseling Consult    Patient: Ivette Kenyon YOB: 1990   Date of Service: 2/09/21      Ivette Kenyon was seen at [facility] for genetic consultation to discuss the options for routine screening for fetal chromosome abnormalities.      Ivette Kenyon was evaluated via a billable telephone visit at [facility] for genetic consultation given the indication.      The patient has been notified of following:    This telephone visit will be conducted via a call between you and your physician/provider. We have found that certain health care needs can be provided without the need for a physical exam. This service lets us provide the care you need with a short phone conversation. If a prescription is necessary we can send it directly to your pharmacy. If lab work is needed we can place an order for that and you can then stop by our lab to have the test done at a later time.     If during the course of the call the provider feels a telephone visit is not appropriate, you will not be charged for this service.      Impression/Plan:   1.  Ivette consented for a blood draw for NIPT (Innatal test through [facility]). She plans to have a blood draw on 02/12/2021. Results are expected within 5-7 days after the blood draw, and will be available in [employer].  We will contact her to discuss the results, and a copy will be forwarded to the office of the referring OB provider. Ivette Kenyon provided verbal permission for results to be left on her voicemail. She requested the sex chromosome results are mailed to her home.     2. We discussed the option of a nuchal translucency ultrasound which would be recommended if the NIPT result is abnormal.    3. Maternal serum AFP (single marker screen) is recommended after 15 weeks to screen for open neural tube defects. A quad screen should not be  performed.    4. We discussed Ivette's family history of breast and ovarian cancer. She asked some excellent question and would like to have recommendations and a plan for mammograms. I would recommend meeting with a cancer genetic counselor.  Physicians can also make referrals by contacting the [facility] Cancer Risk Management Program (1-464.131.6059). Or placing a referral at [email or web address].    Pregnancy History:   /Parity:    Age at Delivery: 31 year old  ZONIA: 2021, by Ultrasound  Gestational Age: 10w1d    No significant complications or exposures were reported in the current pregnancy.    Ivette randhawa pregnancy history is significant for one full term pregnancy with no reported complications, healthy female    Medical History:   Ivette randhawa reported medical history is not expected to impact pregnancy management or risks to fetal development.       Family History:   A three-generation pedigree was obtained, and is scanned under the  Media  tab.   The following significant findings were reported by Ivette:    The father of the pregnancy, Francois, 30, is healthy.    Celine have one healthy daughter, age 18 months       Ivette's brother has a history of two miscarriages and infertility with his partner. The cause is thought to be female factor such as PCOS    We discussed the family history of breast cancer briefly. Ivette's mother had breast cancer at 48 and her paternal grandmother  from ovarian cancer in her 50s.   Cancer most often occurs by chance, however some families seem to develop cancer more frequently than expected. Everyone has a risk to develop cancer, but individuals may be at an increased risk to develop cancer based on their family history. We discussed that young breast and ovarian cancer is rare and can be associated with inherited cancer predisposition syndromes. Genetic counseling is available for cancer syndromes.  "Cancer family history, even without genetic testing, can change cancer screening recommendations for family members and aid in insurance coverage for access to them as well. The most informative individuals to complete cancer genetic counseling and genetic testing are those with a personal history of cancer or those closely related to the affected individuals. This may be Ivette's mother. Ivette thinks her mother may have had testing so I encouraged her to discuss this with her mother. We did discuss that even if her mother had negative genetic testing, this does not eliminate the family history or recommendations for mammograms. In addition, it does not erase the paternal family history of ovarian cancer.     If the family wants more information they can contact the [facility] Cancer Risk Management Program ([phone]). Physicians can also make referrals at [email or web address] or, if within the [facility] system, through [facility] referral for \"Cancer Risk Mgmt/Cancer Genetic Counseling\"     Indications to consider the program include a personal or family history of:  Breast cancer before age 50  Multiple close relatives with breast cancer  Triple negative breast cancer at any age  Ovarian cancer at any age  Male breast cancer  Ashkenazi Sikhism ancestry and breast, pancreatic, ovarian cancer, or prostate cancer with a Akhil score of 7+ at any age    Due to Ivette's family history of breast cancer it may be reasonable to begin early screening mammograms. Screening mammograms are usually not recommended during pregnancy or while breast feeding including up to six months after. Ivette was encouraged to discuss this family history with her medical provider to ensure that screening begins at an appropriate age.    Otherwise, the reported family history is negative for multiple miscarriages, stillbirths, birth defects, mental retardation, known genetic conditions, and " consanguinity.       Carrier Screening:   The patient reports that she and the father of the pregnancy have  ancestry:      Cystic fibrosis is an autosomal recessive genetic condition that occurs with increased frequency in individuals of  ancestry and carrier screening for this condition is available.  In addition,  screening in the [facility] includes cystic fibrosis.      Expanded carrier screening for mutations in a large panel of genes associated with autosomal recessive conditions including cystic fibrosis, spinal muscular atrophy, and others, is now available.      Ivette has not had carrier screening. If she I and /or her partner are interested in further discussing the option of carrier screening, MFM would be available to assist in coordination if desired.        Risk Assessment for Chromosome Conditions:   We explained that the risk for fetal chromosome abnormalities increases with maternal age. We discussed specific features of common chromosome abnormalities, including Down syndrome, trisomy 13, trisomy 18, and sex chromosome trisomies.      At age 31 at midtrimester, the risk to have a baby with Down syndrome is 1 in 421.     At age 31 at midtrimester, the risk to have a baby with any chromosome abnormality is 1 in 217.       Testing Options:   We discussed the following options:   First trimester screening    First trimester ultrasound with nuchal translucency and nasal bone assessments, maternal plasma hCG, FEROZ-A, and AFP measurement    Screens for fetal trisomy 21, trisomy 13, and trisomy 18    Cannot screen for open neural tube defects; maternal serum AFP after 15 weeks is recommended     Non-invasive Prenatal Testing (NIPT)    Maternal plasma cell-free DNA testing; first trimester ultrasound with nuchal translucency and nasal bone assessment is recommended, when appropriate    Screens for fetal trisomy 21, trisomy 13, trisomy 18, and sex chromosome  aneuploidy    Cannot screen for open neural tube defects; maternal serum AFP after 15 weeks is recommended     Chorionic villus sampling (CVS)    Invasive procedure typically performed in the first trimester by which placental villi are obtained for the purpose of chromosome analysis and/or other prenatal genetic analysis    Diagnostic results; >99% sensitivity for fetal chromosome abnormalities    Cannot test for open neural tube defects; maternal serum AFP after 15 weeks is recommended     Genetic Amniocentesis    Invasive procedure typically performed in the second trimester by which amniotic fluid is obtained for the purpose of chromosome analysis and/or other prenatal genetic analysis    Diagnostic results; >99% sensitivity for fetal chromosome abnormalities    AFAFP measurement tests for open neural tube defects     Comprehensive (Level II) ultrasound: Detailed ultrasound performed between 18-22 weeks gestation to screen for major birth defects and markers for aneuploidy.      We reviewed the benefits and limitations of this testing.  Screening tests provide a risk assessment specific to the pregnancy for certain fetal chromosome abnormalities, but cannot definitively diagnose or exclude a fetal chromosome abnormality.  Follow-up genetic counseling and consideration of diagnostic testing is recommended with any abnormal screening result.      It was a pleasure to be involved with [facility] care. Face-to-face time of the meeting was 37 minutes.    Phone call contact time  Call Started at 9:34 am  Call Ended at 10:11 am    Xuan Bennett MS, [facility]  Licensed Genetic Counselor   [facility]  Maternal Fetal Medicine  [email or web address]  [email or web address]  Office: [phone]  Pager [phone]  MFM: [phone]   Fax: [phone]

## 2021-02-12 ENCOUNTER — HOSPITAL ENCOUNTER (OUTPATIENT)
Dept: LAB | Facility: CLINIC | Age: 31
Discharge: HOME | End: 2021-02-12
Attending: OBSTETRICS & GYNECOLOGY | Admitting: OBSTETRICS & GYNECOLOGY
Payer: COMMERCIAL

## 2021-02-12 DIAGNOSIS — O26.90 PREGNANCY RELATED CONDITION, ANTEPARTUM: ICD-10-CM

## 2021-02-12 DIAGNOSIS — Z36.9 PRENATAL SCREENING ENCOUNTER: ICD-10-CM

## 2021-02-12 PROCEDURE — 999N001100 HC STATISTIC VERIFI PRENATAL TRISOMY 21,18,13: Performed by: OBSTETRICS & GYNECOLOGY

## 2021-02-12 PROCEDURE — 36415 COLL VENOUS BLD VENIPUNCTURE: CPT | Performed by: OBSTETRICS & GYNECOLOGY

## 2021-02-18 ENCOUNTER — TELEPHONE (OUTPATIENT)
Dept: MATERNAL FETAL MEDICINE | Facility: CLINIC | Age: 31
End: 2021-02-18

## 2021-02-18 LAB — LAB SCANNED RESULT: NORMAL

## 2021-02-18 NOTE — TELEPHONE ENCOUNTER
Called and discussed normal NIPT results with Ivette. Results indicate NO ANEUPLOIDY DETECTED for chromosomes 21, 18, 13, or sex chromosomes. Ivette provided verbal permission for an e-mail to be sent with the sex chromosome to [email or web address]    This puts her current pregnancy at low risk for Down syndrome, trisomy 18, trisomy 13 and sex chromosome abnormalities. This test is reported to have the following sensitivities: Down syndrome- 99%, trisomy 18- 98%, and trisomy 13- 98%. Although these results are reassuring, this does not replace a standard chromosome analysis from a chorionic villus sampling or amniocentesis.     An ultrasound for nuchal translucency assessment still remains an option.    MSAFP is the appropriate second trimester screening test for open neural tube defects; the maternal quad screen is not recommended.    Her results will be faxed to her primary OB to review.    Xuan Bennett MS, [facility]  Licensed Genetic Counselor   [facility]  Maternal Fetal Medicine  [email or web address]  [email or web address]  Office: [phone]  Pager [phone]  M: [phone]   Fax: [phone]